# Patient Record
Sex: FEMALE | Race: WHITE
[De-identification: names, ages, dates, MRNs, and addresses within clinical notes are randomized per-mention and may not be internally consistent; named-entity substitution may affect disease eponyms.]

---

## 2018-08-03 ENCOUNTER — HOSPITAL ENCOUNTER (EMERGENCY)
Dept: HOSPITAL 92 - ERS | Age: 18
Discharge: HOME | End: 2018-08-03
Payer: COMMERCIAL

## 2018-08-03 DIAGNOSIS — O21.9: Primary | ICD-10-CM

## 2018-08-03 DIAGNOSIS — Z3A.01: ICD-10-CM

## 2018-08-03 LAB
BASOPHILS # BLD AUTO: 0 THOU/UL (ref 0–0.2)
BASOPHILS NFR BLD AUTO: 0.4 % (ref 0–1)
EOSINOPHIL # BLD AUTO: 0 THOU/UL (ref 0–0.7)
EOSINOPHIL NFR BLD AUTO: 0.3 % (ref 0–10)
HGB BLD-MCNC: 12.5 G/DL (ref 12–16)
LYMPHOCYTES # BLD: 2.3 THOU/UL (ref 1.2–3.4)
LYMPHOCYTES NFR BLD AUTO: 30.1 % (ref 28–48)
MCH RBC QN AUTO: 26.8 PG (ref 25–35)
MCV RBC AUTO: 79.8 FL (ref 78–98)
MONOCYTES # BLD AUTO: 0.6 THOU/UL (ref 0.11–0.59)
MONOCYTES NFR BLD AUTO: 8.5 % (ref 0–4)
NEUTROPHILS # BLD AUTO: 4.6 THOU/UL (ref 1.4–6.5)
NEUTROPHILS NFR BLD AUTO: 60.7 % (ref 31–61)
PLATELET # BLD AUTO: 300 THOU/UL (ref 130–400)
RBC # BLD AUTO: 4.66 MILL/UL (ref 4–5.2)
SP GR UR STRIP: 1.02 (ref 1–1.04)
WBC # BLD AUTO: 7.5 THOU/UL (ref 4.8–10.8)

## 2018-08-03 PROCEDURE — 93976 VASCULAR STUDY: CPT

## 2018-08-03 PROCEDURE — 96360 HYDRATION IV INFUSION INIT: CPT

## 2018-08-03 PROCEDURE — 84702 CHORIONIC GONADOTROPIN TEST: CPT

## 2018-08-03 PROCEDURE — 87491 CHLMYD TRACH DNA AMP PROBE: CPT

## 2018-08-03 PROCEDURE — 85025 COMPLETE CBC W/AUTO DIFF WBC: CPT

## 2018-08-03 PROCEDURE — 86901 BLOOD TYPING SEROLOGIC RH(D): CPT

## 2018-08-03 PROCEDURE — 87591 N.GONORRHOEAE DNA AMP PROB: CPT

## 2018-08-03 PROCEDURE — 87480 CANDIDA DNA DIR PROBE: CPT

## 2018-08-03 PROCEDURE — 76856 US EXAM PELVIC COMPLETE: CPT

## 2018-08-03 PROCEDURE — 96361 HYDRATE IV INFUSION ADD-ON: CPT

## 2018-08-03 PROCEDURE — 87510 GARDNER VAG DNA DIR PROBE: CPT

## 2018-08-03 PROCEDURE — 81003 URINALYSIS AUTO W/O SCOPE: CPT

## 2018-08-03 PROCEDURE — 86900 BLOOD TYPING SEROLOGIC ABO: CPT

## 2018-08-03 PROCEDURE — 87660 TRICHOMONAS VAGIN DIR PROBE: CPT

## 2018-08-03 NOTE — ULT
TRANSABDOMINAL PELVIC ULTRASOUND WITH DOPPLERE:

 

Date:  08/03/18 

 

HISTORY:  

Positive pregnancy test. 

 

FINDINGS:

The uterus measures 9.1 x 4.2 x 5.0 cm. The right ovary measures 3.0 x 1.8 x 2.4 cm and the left ovar
y measures 3.6 x 1.8 x 1.4 cm. Flow is demonstrated to both ovaries. 

 

A single live intrauterine gestation is seen with measurements corresponding to an estimated gestatio
nal age of 6 weeks/6 days and YOVANI at 03/23/19. The crown-rump length measures 0.8 cm, gestational sac
 diameter of 2.01 cm, and yolk sac diameter of 0.17 cm. Fetal heart rate measures 113 beats/minute. N
o subchorionic hemorrhage is seen. No free fluid is seen in the pelvis. 

 

IMPRESSION: 

Single live intrauterine gestation of 6 weeks/6 days estimated gestational age and YOVANI at 03/23/19. 

 

 

POS: [facility]

## 2018-08-10 ENCOUNTER — HOSPITAL ENCOUNTER (OUTPATIENT)
Dept: HOSPITAL 92 - BICULT | Age: 18
Discharge: HOME | End: 2018-08-10
Attending: NURSE PRACTITIONER
Payer: COMMERCIAL

## 2018-08-10 DIAGNOSIS — Z32.00: Primary | ICD-10-CM

## 2018-08-10 PROCEDURE — 76856 US EXAM PELVIC COMPLETE: CPT

## 2020-02-19 ENCOUNTER — HOSPITAL ENCOUNTER (EMERGENCY)
Dept: HOSPITAL 9 - MADERS | Age: 20
Discharge: HOME | End: 2020-02-19
Payer: COMMERCIAL

## 2020-02-19 DIAGNOSIS — E66.9: ICD-10-CM

## 2020-02-19 DIAGNOSIS — K80.50: Primary | ICD-10-CM

## 2020-02-19 LAB
ALBUMIN SERPL BCG-MCNC: 4.3 G/DL (ref 3.5–5)
ALP SERPL-CCNC: 89 U/L (ref 40–110)
ALT SERPL W P-5'-P-CCNC: 49 U/L (ref 8–55)
ANION GAP SERPL CALC-SCNC: 14 MMOL/L (ref 10–20)
AST SERPL-CCNC: 66 U/L (ref 5–34)
BACTERIA UR QL AUTO: (no result) HPF
BASOPHILS # BLD AUTO: 0.1 THOU/UL (ref 0–0.2)
BASOPHILS NFR BLD AUTO: 0.6 % (ref 0–1)
BILIRUB SERPL-MCNC: 0.6 MG/DL (ref 0.2–1.2)
BUN SERPL-MCNC: 10 MG/DL (ref 7–18.7)
CALCIUM SERPL-MCNC: 9.3 MG/DL (ref 7.8–10.44)
CHLORIDE SERPL-SCNC: 105 MMOL/L (ref 98–107)
CO2 SERPL-SCNC: 25 MMOL/L (ref 22–29)
CREAT CL PREDICTED SERPL C-G-VRATE: 0 ML/MIN (ref 70–130)
EOSINOPHIL # BLD AUTO: 0 THOU/UL (ref 0–0.7)
EOSINOPHIL NFR BLD AUTO: 0.1 % (ref 0–10)
GLOBULIN SER CALC-MCNC: 3.3 G/DL (ref 2.4–3.5)
GLUCOSE SERPL-MCNC: 130 MG/DL (ref 70–105)
HGB BLD-MCNC: 13.3 G/DL (ref 12–16)
LIPASE SERPL-CCNC: 43 U/L (ref 8–78)
LYMPHOCYTES # BLD AUTO: 2 THOU/UL (ref 1.2–3.4)
LYMPHOCYTES NFR BLD AUTO: 21 % (ref 21–51)
MCH RBC QN AUTO: 25.8 PG (ref 27–31)
MCV RBC AUTO: 84.4 FL (ref 78–98)
MONOCYTES # BLD AUTO: 0.6 THOU/UL (ref 0.11–0.59)
MONOCYTES NFR BLD AUTO: 5.8 % (ref 0–10)
MUCOUS THREADS UR QL AUTO: (no result) LPF
NEUTROPHILS # BLD AUTO: 6.8 THOU/UL (ref 1.4–6.5)
NEUTROPHILS NFR BLD AUTO: 72.5 % (ref 42–75)
PLATELET # BLD AUTO: 322 THOU/UL (ref 130–400)
POTASSIUM SERPL-SCNC: 4.5 MMOL/L (ref 3.5–5.1)
PREGU CONTROL BACKGROUND?: (no result)
PREGU CONTROL BAR APPEAR?: YES
PROT UR STRIP.AUTO-MCNC: 100 MG/DL
RBC # BLD AUTO: 5.17 MILL/UL (ref 4.2–5.4)
RBC UR QL AUTO: (no result) HPF (ref 0–3)
SODIUM SERPL-SCNC: 139 MMOL/L (ref 136–145)
WBC # BLD AUTO: 9.4 THOU/UL (ref 4.8–10.8)
WBC UR QL AUTO: (no result) HPF (ref 0–3)

## 2020-02-19 PROCEDURE — 93005 ELECTROCARDIOGRAM TRACING: CPT

## 2020-02-19 PROCEDURE — 96374 THER/PROPH/DIAG INJ IV PUSH: CPT

## 2020-02-19 PROCEDURE — 81015 MICROSCOPIC EXAM OF URINE: CPT

## 2020-02-19 PROCEDURE — 81025 URINE PREGNANCY TEST: CPT

## 2020-02-19 PROCEDURE — 81003 URINALYSIS AUTO W/O SCOPE: CPT

## 2020-02-19 PROCEDURE — 83690 ASSAY OF LIPASE: CPT

## 2020-02-19 PROCEDURE — 80053 COMPREHEN METABOLIC PANEL: CPT

## 2020-02-19 PROCEDURE — 85025 COMPLETE CBC W/AUTO DIFF WBC: CPT

## 2020-12-10 ENCOUNTER — HOSPITAL ENCOUNTER (OUTPATIENT)
Dept: HOSPITAL 92 - ERS | Age: 20
Setting detail: OBSERVATION
LOS: 1 days | Discharge: HOME | End: 2020-12-11
Attending: SURGERY | Admitting: SURGERY
Payer: SELF-PAY

## 2020-12-10 VITALS — BODY MASS INDEX: 41.3 KG/M2

## 2020-12-10 DIAGNOSIS — E66.01: ICD-10-CM

## 2020-12-10 DIAGNOSIS — Z20.828: ICD-10-CM

## 2020-12-10 DIAGNOSIS — K80.12: Primary | ICD-10-CM

## 2020-12-10 DIAGNOSIS — Z01.812: ICD-10-CM

## 2020-12-10 LAB
ALBUMIN SERPL BCG-MCNC: 4.8 G/DL (ref 3.5–5)
ALP SERPL-CCNC: 81 U/L (ref 40–110)
ALT SERPL W P-5'-P-CCNC: 71 U/L (ref 8–55)
ANION GAP SERPL CALC-SCNC: 16 MMOL/L (ref 10–20)
AST SERPL-CCNC: 143 U/L (ref 5–34)
BASOPHILS # BLD AUTO: 0 THOU/UL (ref 0–0.2)
BASOPHILS NFR BLD AUTO: 0.2 % (ref 0–1)
BILIRUB SERPL-MCNC: 0.8 MG/DL (ref 0.2–1.2)
BUN SERPL-MCNC: 8 MG/DL (ref 7–18.7)
CALCIUM SERPL-MCNC: 9.5 MG/DL (ref 7.8–10.44)
CHLORIDE SERPL-SCNC: 98 MMOL/L (ref 98–107)
CO2 SERPL-SCNC: 28 MMOL/L (ref 22–29)
CREAT CL PREDICTED SERPL C-G-VRATE: 0 ML/MIN (ref 70–130)
EOSINOPHIL # BLD AUTO: 0.1 THOU/UL (ref 0–0.7)
EOSINOPHIL NFR BLD AUTO: 0.7 % (ref 0–10)
GLOBULIN SER CALC-MCNC: 3.5 G/DL (ref 2.4–3.5)
GLUCOSE SERPL-MCNC: 120 MG/DL (ref 70–105)
HBCM INDEX: 0.16 S/CO (ref 0–0.79)
HBSAG INDEX: 0.22 S/CO (ref 0–0.99)
HEP A IGM S/CO: 0.22 S/CO (ref 0–0.79)
HEP C INDEX: 0.13 S/CO (ref 0–0.79)
HGB BLD-MCNC: 15.3 G/DL (ref 12–16)
LIPASE SERPL-CCNC: 29 U/L (ref 8–78)
LYMPHOCYTES # BLD: 1.8 THOU/UL (ref 1.2–3.4)
LYMPHOCYTES NFR BLD AUTO: 12.1 % (ref 21–51)
MCH RBC QN AUTO: 28.3 PG (ref 27–31)
MCV RBC AUTO: 83.5 FL (ref 78–98)
MONOCYTES # BLD AUTO: 0.8 THOU/UL (ref 0.11–0.59)
MONOCYTES NFR BLD AUTO: 5.4 % (ref 0–10)
NEUTROPHILS # BLD AUTO: 12.4 THOU/UL (ref 1.4–6.5)
NEUTROPHILS NFR BLD AUTO: 81.7 % (ref 42–75)
PLATELET # BLD AUTO: 343 THOU/UL (ref 130–400)
POTASSIUM SERPL-SCNC: 4 MMOL/L (ref 3.5–5.1)
PREGS CONTROL BACKGROUND?: (no result)
PREGS CONTROL BAR APPEAR?: YES
PREGU CONTROL BACKGROUND?: (no result)
PREGU CONTROL BAR APPEAR?: YES
PROT UR STRIP.AUTO-MCNC: 10 MG/DL
RBC # BLD AUTO: 5.43 MILL/UL (ref 4.2–5.4)
SODIUM SERPL-SCNC: 138 MMOL/L (ref 136–145)
SP GR UR STRIP: 1.05 (ref 1–1.04)
WBC # BLD AUTO: 15.1 THOU/UL (ref 4.8–10.8)

## 2020-12-10 PROCEDURE — S0020 INJECTION, BUPIVICAINE HYDRO: HCPCS

## 2020-12-10 PROCEDURE — U0003 INFECTIOUS AGENT DETECTION BY NUCLEIC ACID (DNA OR RNA); SEVERE ACUTE RESPIRATORY SYNDROME CORONAVIRUS 2 (SARS-COV-2) (CORONAVIRUS DISEASE [COVID-19]), AMPLIFIED PROBE TECHNIQUE, MAKING USE OF HIGH THROUGHPUT TECHNOLOGIES AS DESCRIBED BY CMS-2020-01-R: HCPCS

## 2020-12-10 PROCEDURE — 81025 URINE PREGNANCY TEST: CPT

## 2020-12-10 PROCEDURE — 87635 SARS-COV-2 COVID-19 AMP PRB: CPT

## 2020-12-10 PROCEDURE — 83690 ASSAY OF LIPASE: CPT

## 2020-12-10 PROCEDURE — G0378 HOSPITAL OBSERVATION PER HR: HCPCS

## 2020-12-10 PROCEDURE — 87086 URINE CULTURE/COLONY COUNT: CPT

## 2020-12-10 PROCEDURE — 74177 CT ABD & PELVIS W/CONTRAST: CPT

## 2020-12-10 PROCEDURE — 80074 ACUTE HEPATITIS PANEL: CPT

## 2020-12-10 PROCEDURE — 87040 BLOOD CULTURE FOR BACTERIA: CPT

## 2020-12-10 PROCEDURE — 81003 URINALYSIS AUTO W/O SCOPE: CPT

## 2020-12-10 PROCEDURE — 96376 TX/PRO/DX INJ SAME DRUG ADON: CPT

## 2020-12-10 PROCEDURE — G0008 ADMIN INFLUENZA VIRUS VAC: HCPCS

## 2020-12-10 PROCEDURE — 90662 IIV NO PRSV INCREASED AG IM: CPT

## 2020-12-10 PROCEDURE — 83605 ASSAY OF LACTIC ACID: CPT

## 2020-12-10 PROCEDURE — 36415 COLL VENOUS BLD VENIPUNCTURE: CPT

## 2020-12-10 PROCEDURE — 80053 COMPREHEN METABOLIC PANEL: CPT

## 2020-12-10 PROCEDURE — 90471 IMMUNIZATION ADMIN: CPT

## 2020-12-10 PROCEDURE — 76705 ECHO EXAM OF ABDOMEN: CPT

## 2020-12-10 PROCEDURE — 96365 THER/PROPH/DIAG IV INF INIT: CPT

## 2020-12-10 PROCEDURE — 88304 TISSUE EXAM BY PATHOLOGIST: CPT

## 2020-12-10 PROCEDURE — 96367 TX/PROPH/DG ADDL SEQ IV INF: CPT

## 2020-12-10 PROCEDURE — 85025 COMPLETE CBC W/AUTO DIFF WBC: CPT

## 2020-12-10 PROCEDURE — 96372 THER/PROPH/DIAG INJ SC/IM: CPT

## 2020-12-10 PROCEDURE — 84703 CHORIONIC GONADOTROPIN ASSAY: CPT

## 2020-12-10 PROCEDURE — 96375 TX/PRO/DX INJ NEW DRUG ADDON: CPT

## 2020-12-10 NOTE — CT
CT abdomen and pelvis with IV contrast



HISTORY: Right upper quadrant pain.



FINDINGS: Mild atelectasis at the lung bases. Small hyperdense stones within the dependent portion of
 the gallbladder lumen. There is thickening of the gallbladder wall with the appearance of edema.

Subtle stranding in the adjacent fat. Gallbladder appears somewhat distended. Common duct is nondilat
ed.



Solid organs are intact. No free air or free fluid



Urinary bladder is unremarkable.



No evidence of bowel obstruction. Subtle inflamed appearance of the hepatic flexure of the colon favo
red to be secondary and related to the adjacent gallbladder inflammation.

  



IMPRESSION :

Abnormal gallbladder. Appearance of acute cholecystitis (with cholelithiasis).



Reported By: MARYANA Lee 

Electronically Signed:  12/10/2020 1:17 PM

## 2020-12-10 NOTE — HP
HISTORY OF PRESENT ILLNESS:  Alejandra Lau is a 20-year-old female developed

right upper quadrant epigastric pain, back radiation, evaluated in the emergency

room, felt to have exam consistent with cholecystitis.  She underwent a gallbladder

ultrasound that was unremarkable.  She subsequently underwent a CAT scan

demonstrating information about the gallbladder and stones not seen on ultrasound.

The patient had received antibiotics, fluids in the OR was listed early afternoon,

but they could not accommodate cholecystectomy until late evening, thus she is

scheduled for COVID preprocedural test ordered. 



ALLERGIES:  NONE.



TOBACCO:  None.



ALCOHOL:  Socially.



MEDICATIONS:  None routinely.



PAST SURGICAL HISTORY:  .



PAST MEDICAL HISTORY:  Obesity.  Of note, the patient works as a dispatcher.  She is

a single mother.  She reports she had an ultrasound in [place] a year ago that

demonstrated sludge.  She has had intermittent symptoms for the past two years

suggestive of biliary disease. 



REVIEW OF SYSTEMS:  Noncontributory.



FAMILY HISTORY:  Noncontributory.



PHYSICAL EXAMINATION:

GENERAL:  Morbidly obese. 

VITAL SIGNS:  124/78, respiratory rate 16, heart rate 78. 

HEAD, EARS, EYES, NOSE AND THROAT:  Unremarkable.  Sclerae nonicteric. 

SKIN:  Nonjaundiced. 

LUNGS:  Clear to auscultation. 

CARDIAC:  Regular rate and rhythm without murmur or gallop. 

ABDOMEN:  Soft with positive Haro sign.  Tenderness in right upper quadrant. 

EXTREMITIES:  Unremarkable.



LABORATORY DATA:  White count 15, hemoglobin 15.  Basic metabolic profile normal.

Liver function tests normal except for AST,  and 71 respectively.  Serum

pregnancy test negative.  Lipase 29.  Hepatitis survey negative.  Await COVID

preprocedural screening pending. 



ASSESSMENT AND PLAN:  Acute cholecystitis.  We will plan admission to the hospital

with intravenous antibiotics and plan laparoscopic cholecystectomy tomorrow.  Risks

and benefits of operation discussed.  She consents. 







Job ID:  [id number]

## 2020-12-11 VITALS — SYSTOLIC BLOOD PRESSURE: 119 MMHG | TEMPERATURE: 98.9 F | DIASTOLIC BLOOD PRESSURE: 72 MMHG

## 2020-12-11 PROCEDURE — 0FT44ZZ RESECTION OF GALLBLADDER, PERCUTANEOUS ENDOSCOPIC APPROACH: ICD-10-PCS | Performed by: SURGERY

## 2020-12-11 NOTE — OP
DATE OF PROCEDURE:  12/11/2020



PREOPERATIVE DIAGNOSES:  Chronic cholecystitis, acute cholecystitis, cholelithiasis,

morbid obesity. 



POSTOPERATIVE DIAGNOSES:  Chronic cholecystitis, acute cholecystitis,

cholelithiasis, morbid obesity. 



PROCEDURE PERFORMED:  Laparoscopic video cholecystectomy.



ANESTHESIA:  General, local 0.5% Marcaine with epinephrine 30 mL.



DESCRIPTION OF PROCEDURE:  The patient was taken to the operating room, where under

general anesthesia, abdomen was prepared with ChloraPrep and draped in routine

fashion.  Local anesthetic was infiltrated in the skin and subcutaneous tissue about

the operative site.  Supraumbilical incision made, pneumoperitoneum to 15 mmHg

obtained with a Veress needle, replaced with a 5 port, video laparoscope inserted.

Right subxiphoid incision was made and 11 port placed, right subcostal incision made

in midclavicular anterior axillary line and 5 ports placed.  Liver appeared to be

normal.  Gallbladder acutely inflamed.  Fundus grasped and retracted cephalad.  The

infundibulum was grasped and retracted laterally.  Cystic artery and duct carefully

dissected free.  Critical view obtained.  Cystic artery and duct doubly clipped

proximally and divided, gallbladder dissected free from inflammatory attachments to

the liver bed, and gallbladder and contents removed, submitted to Pathology.  Good

hemostasis obtained with cautery.  Irrigant and pneumoperitoneum evacuated.  All

instruments were removed and all skin incisions were approximated with interrupted

subdermal 4-0 Monocryl, and Derma glue applied. 







Job ID:  [id number]

## 2020-12-12 NOTE — DIS
DATE OF ADMISSION:  12/10/2020



DATE OF DISCHARGE:  12/11/2020



DISCHARGE DIAGNOSES:  Morbid obesity, acute on chronic cholecystitis, cholelithiasis.



PROCEDURE PERFORMED:  Laparoscopic video cholecystectomy.



COVID PREPROCEDURAL SCREENING:  Negative.



HISTORY:  A 20-year-old female, previous ultrasound revealed sludge, continued to

have biliary symptoms, presented to the hospital.  On this occasion, had a CAT scan

and ultrasound revealing cholecystitis and cholelithiasis.  COVID preprocedural

screen was negative.  Attempt was made to perform this on the day of surgery, but

there was no OR time available in the morning or afternoon, though she was

hospitalized overnight with antibiotics and taken to the operating room on

12/11/2020 for laparoscopic cholecystectomy with findings of acute cholecystitis,

cholelithiasis.  Postoperatively, the patient convalesced, tolerated diet. 



DISCHARGE MEDICATIONS:  She is sent home with pain control with:

1. Tylenol.

2. Motrin over-the-counter.

3. Ultram if needed.

4. Zofran p.r.n.



DISCHARGE INSTRUCTIONS:  Diet and activity, as tolerated.  No lifting restrictions.

Follow up in my office in 2 to 3 weeks. 







Job ID:  [id number]

## 2020-12-14 ENCOUNTER — HOSPITAL ENCOUNTER (INPATIENT)
Dept: HOSPITAL 92 - ERS | Age: 20
LOS: 2 days | Discharge: HOME | DRG: 395 | End: 2020-12-16
Attending: SURGERY | Admitting: SURGERY
Payer: SELF-PAY

## 2020-12-14 VITALS — BODY MASS INDEX: 38.9 KG/M2

## 2020-12-14 DIAGNOSIS — K91.86: Primary | ICD-10-CM

## 2020-12-14 DIAGNOSIS — E66.01: ICD-10-CM

## 2020-12-14 DIAGNOSIS — Z90.49: ICD-10-CM

## 2020-12-14 DIAGNOSIS — R79.89: ICD-10-CM
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
## 2020-12-14 LAB
ALBUMIN SERPL BCG-MCNC: 4.5 G/DL (ref 3.5–5)
ALP SERPL-CCNC: 121 U/L (ref 40–110)
ALT SERPL W P-5'-P-CCNC: 201 U/L (ref 8–55)
ANION GAP SERPL CALC-SCNC: 18 MMOL/L (ref 10–20)
AST SERPL-CCNC: 147 U/L (ref 5–34)
BASOPHILS # BLD AUTO: 0.1 THOU/UL (ref 0–0.2)
BASOPHILS NFR BLD AUTO: 0.7 % (ref 0–1)
BILIRUB SERPL-MCNC: 4.3 MG/DL (ref 0.2–1.2)
BUN SERPL-MCNC: 6 MG/DL (ref 7–18.7)
CALCIUM SERPL-MCNC: 9.9 MG/DL (ref 7.8–10.44)
CHLORIDE SERPL-SCNC: 97 MMOL/L (ref 98–107)
CO2 SERPL-SCNC: 27 MMOL/L (ref 22–29)
CREAT CL PREDICTED SERPL C-G-VRATE: 0 ML/MIN (ref 70–130)
EOSINOPHIL # BLD AUTO: 0.1 THOU/UL (ref 0–0.7)
EOSINOPHIL NFR BLD AUTO: 0.8 % (ref 0–10)
GLOBULIN SER CALC-MCNC: 4.1 G/DL (ref 2.4–3.5)
GLUCOSE SERPL-MCNC: 105 MG/DL (ref 70–105)
HGB BLD-MCNC: 14.4 G/DL (ref 12–16)
LYMPHOCYTES # BLD: 2.1 THOU/UL (ref 1.2–3.4)
LYMPHOCYTES NFR BLD AUTO: 24.2 % (ref 21–51)
MCH RBC QN AUTO: 27.7 PG (ref 27–31)
MCV RBC AUTO: 83.9 FL (ref 78–98)
MONOCYTES # BLD AUTO: 0.6 THOU/UL (ref 0.11–0.59)
MONOCYTES NFR BLD AUTO: 7.3 % (ref 0–10)
NEUTROPHILS # BLD AUTO: 5.8 THOU/UL (ref 1.4–6.5)
NEUTROPHILS NFR BLD AUTO: 67 % (ref 42–75)
PLATELET # BLD AUTO: 398 THOU/UL (ref 130–400)
POTASSIUM SERPL-SCNC: 3.9 MMOL/L (ref 3.5–5.1)
PROT UR STRIP.AUTO-MCNC: 20 MG/DL
RBC # BLD AUTO: 5.2 MILL/UL (ref 4.2–5.4)
SODIUM SERPL-SCNC: 138 MMOL/L (ref 136–145)
SP GR UR STRIP: (no result) (ref 1–1.04)
WBC # BLD AUTO: 8.6 THOU/UL (ref 4.8–10.8)
WBC UR QL AUTO: (no result) HPF (ref 0–3)

## 2020-12-14 PROCEDURE — 78226 HEPATOBILIARY SYSTEM IMAGING: CPT

## 2020-12-14 PROCEDURE — 80053 COMPREHEN METABOLIC PANEL: CPT

## 2020-12-14 PROCEDURE — 36415 COLL VENOUS BLD VENIPUNCTURE: CPT

## 2020-12-14 PROCEDURE — 74330 X-RAY BILE/PANC ENDOSCOPY: CPT

## 2020-12-14 PROCEDURE — S0028 INJECTION, FAMOTIDINE, 20 MG: HCPCS

## 2020-12-14 PROCEDURE — 80076 HEPATIC FUNCTION PANEL: CPT

## 2020-12-14 PROCEDURE — 74177 CT ABD & PELVIS W/CONTRAST: CPT

## 2020-12-14 PROCEDURE — 83690 ASSAY OF LIPASE: CPT

## 2020-12-14 PROCEDURE — 85025 COMPLETE CBC W/AUTO DIFF WBC: CPT

## 2020-12-14 PROCEDURE — 81003 URINALYSIS AUTO W/O SCOPE: CPT

## 2020-12-14 PROCEDURE — A9537 TC99M MEBROFENIN: HCPCS

## 2020-12-14 PROCEDURE — 81015 MICROSCOPIC EXAM OF URINE: CPT

## 2020-12-14 RX ADMIN — FAMOTIDINE SCH MG: 10 INJECTION, SOLUTION INTRAVENOUS at 22:30

## 2020-12-14 NOTE — HP
HISTORY OF PRESENT ILLNESS:  Alejandra Lau is a 20-year-old female, who three

days ago underwent laparoscopic video cholecystectomy for acute cholecystitis and

cholelithiasis.  Preoperatively, her bile duct was not dilated.  Her liver function

tests were normal.  She was discharged home the same day.  She states she visited my

office today, was complaining of vomiting over the weekend that would not resolve.

She appeared to be dehydrated.  Her vital signs were normal in the office.  She

lives in [place].  She is accompanied by her mother.  The patient was admitted for

IV fluid hydration and evaluation.  Laboratories revealed white count of 8 and

hemoglobin 14.  Bilirubin was 4.3.  Transaminases elevated.  Lipase was normal.

Electrolytes normal.  The patient underwent a CAT scan of the abdomen and pelvis

revealing changes consistent with choledocholithiasis and biliary dilatation.  HIDA

scan had already been ordered and before I can cancel it, it is being performed,

results pending.  Plan is consultation with GI for ERCP.  On CAT scan, there is no

significant fluid subhepatic collection to suggest a bile leak. 



PAST MEDICAL HISTORY:  For past history, see a recent history and physical.



PHYSICAL EXAMINATION:

VITAL SIGNS:  227 pounds, 63 inches, and 40 BMI.  135/102, 86, and 99.3 degrees. 

HEAD, EARS, EYES, NOSE, AND THROAT:  Unremarkable.  Sclerae __________. 

SKIN:  Nonjaundiced. 

LUNGS:  Clear to auscultation. 

CARDIAC:  Regular rate and rhythm without murmur or gallop. 

ABDOMEN:  Soft and nontender.  Surgical wounds look good. 

EXTREMITIES:  Unremarkable.



ASSESSMENT AND PLAN:  

1. Suspect choledocholithiasis postoperatively.  Liver function tests and bile duct

were normal preoperatively.  Bile duct was not dilated preoperatively.  Consult

Gastroenterology for endoscopic retrograde cholangiopancreatography. 

2. Morbid obesity.







Job ID:  [id number]

## 2020-12-14 NOTE — CT
EXAM:  CT ABDOMEN AND PELVIS



HISTORY: Status post cholecystectomy. Nausea and vomiting. Pain.



COMPARISON: 12/10/2020



Procedure: 



Multiple contiguous axial images were obtained and a CT of the abdomen and pelvis with IV contrast. C
oronal reformats were performed.



FINDINGS:



Lower Chest: Minimal atelectasis in the lung bases

Vessels: Normal caliber aorta 

Heart: Normal heart size

Abdomen:

Portal vein:Patent

Gallbladder: Findings compatible with recent cholecystectomy. There is dilatation of the intrahepatic
 and extra hepatic biliary system secondary to a calculus in the distal common bile duct measuring

0.4 cm.

Liver: within normal limits.

Pancreas: within normal limits.

Spleen: within normal limits.

Adrenals: within normal limits.

Kidneys: Symmetric enhancement. No obstructive uropathy.

Peritoneum: No ascites or free air, no fluid collection.

Bowel: Limited evaluation due to the lack of oral contrast administration. No evidence of bowel obstr
uction. Ileocecal junction is unremarkable. Normal caliber appendix. Scattered fecal material in a

nondistended, nondilated colon.

Mesentery and Retroperitoneum: No enlarged mesenteric or retroperitoneal lymph nodes.

Abdominal Wall: Findings compatible with recent endoscopic surgery. Stable dehiscence of the anterior
 abdominal musculature.



Pelvis:

Reproductive Organs: Hypodensity in the left and right adnexa compatible with follicles.

Pelvis: No mass, lymphadenopathy, free air or free fluid. 

Bladder: within normal limits.



Bones: within normal limits.  



IMPRESSION:





1. Findings consistent with recent cholecystectomy

2. Dilatation of the intrahepatic and extra hepatic biliary system secondary to choledocholithiasis.

3. Findings conveyed to Dr. Bill 12/14/2020 at 7:03 PM

Code CR



Reported By: Afshan Wolfe 

Electronically Signed:  12/14/2020 7:05 PM

## 2020-12-14 NOTE — NM
Exam: Nuclear medicine HIDA scan



HISTORY: Recent cholecystectomy. Pain. Evaluate for leak.



COMPARISON: None

Correlation: Abdomen and pelvic CT 12/14/2020



TECHNIQUE: Patient was ministered 5.2 mCi of technetium 99m mebrofenin intravenously



FINDINGS: Appropriate uptake of the radiotracer by the liver. There is no evidence of leak. Note, rad
iotracer does not opacify the intrahepatic or extrahepatic biliary system. Correlation made with

recent CT does demonstrate intra and extra hepatic biliary dilatation secondary to a choledocholithia
sis.



IMPRESSION:

1. No sonographic evidence of a bile leak

2. Lack of opacification of the intrahepatic and extra hepatic biliary system which may be due to obs
truction from a choledocholithiasis. Consider ERCP.



Reported By: Afshan Wolfe 

Electronically Signed:  12/14/2020 9:23 PM

## 2020-12-15 LAB
ALBUMIN SERPL BCG-MCNC: 3.7 G/DL (ref 3.5–5)
ALP SERPL-CCNC: 109 U/L (ref 40–110)
ALT SERPL W P-5'-P-CCNC: 196 U/L (ref 8–55)
ANION GAP SERPL CALC-SCNC: 17 MMOL/L (ref 10–20)
AST SERPL-CCNC: 154 U/L (ref 5–34)
BASOPHILS # BLD AUTO: 0 THOU/UL (ref 0–0.2)
BASOPHILS NFR BLD AUTO: 0.7 % (ref 0–1)
BILIRUB SERPL-MCNC: 4.4 MG/DL (ref 0.2–1.2)
BUN SERPL-MCNC: 6 MG/DL (ref 7–18.7)
CALCIUM SERPL-MCNC: 9.2 MG/DL (ref 7.8–10.44)
CHLORIDE SERPL-SCNC: 98 MMOL/L (ref 98–107)
CO2 SERPL-SCNC: 27 MMOL/L (ref 22–29)
CREAT CL PREDICTED SERPL C-G-VRATE: 0 ML/MIN (ref 70–130)
EOSINOPHIL # BLD AUTO: 0.1 THOU/UL (ref 0–0.7)
EOSINOPHIL NFR BLD AUTO: 1.6 % (ref 0–10)
GLOBULIN SER CALC-MCNC: 3.4 G/DL (ref 2.4–3.5)
GLUCOSE SERPL-MCNC: 99 MG/DL (ref 70–105)
HGB BLD-MCNC: 12.6 G/DL (ref 12–16)
LYMPHOCYTES # BLD: 2.1 THOU/UL (ref 1.2–3.4)
LYMPHOCYTES NFR BLD AUTO: 28.3 % (ref 21–51)
MCH RBC QN AUTO: 28.1 PG (ref 27–31)
MCV RBC AUTO: 85 FL (ref 78–98)
MONOCYTES # BLD AUTO: 0.6 THOU/UL (ref 0.11–0.59)
MONOCYTES NFR BLD AUTO: 8.1 % (ref 0–10)
NEUTROPHILS # BLD AUTO: 4.6 THOU/UL (ref 1.4–6.5)
NEUTROPHILS NFR BLD AUTO: 61.3 % (ref 42–75)
PLATELET # BLD AUTO: 323 THOU/UL (ref 130–400)
POTASSIUM SERPL-SCNC: 3.5 MMOL/L (ref 3.5–5.1)
RBC # BLD AUTO: 4.46 MILL/UL (ref 4.2–5.4)
SODIUM SERPL-SCNC: 138 MMOL/L (ref 136–145)
WBC # BLD AUTO: 7.5 THOU/UL (ref 4.8–10.8)

## 2020-12-15 PROCEDURE — 0F798ZZ DILATION OF COMMON BILE DUCT, VIA NATURAL OR ARTIFICIAL OPENING ENDOSCOPIC: ICD-10-PCS | Performed by: INTERNAL MEDICINE

## 2020-12-15 PROCEDURE — 0F768ZZ DILATION OF LEFT HEPATIC DUCT, VIA NATURAL OR ARTIFICIAL OPENING ENDOSCOPIC: ICD-10-PCS | Performed by: INTERNAL MEDICINE

## 2020-12-15 PROCEDURE — 0F758ZZ DILATION OF RIGHT HEPATIC DUCT, VIA NATURAL OR ARTIFICIAL OPENING ENDOSCOPIC: ICD-10-PCS | Performed by: INTERNAL MEDICINE

## 2020-12-15 RX ADMIN — FAMOTIDINE SCH MG: 10 INJECTION, SOLUTION INTRAVENOUS at 08:00

## 2020-12-15 RX ADMIN — ONDANSETRON PRN MG: 2 INJECTION INTRAMUSCULAR; INTRAVENOUS at 11:46

## 2020-12-15 RX ADMIN — ONDANSETRON PRN MG: 2 INJECTION INTRAMUSCULAR; INTRAVENOUS at 05:55

## 2020-12-15 RX ADMIN — FAMOTIDINE SCH MG: 10 INJECTION, SOLUTION INTRAVENOUS at 20:13

## 2020-12-15 NOTE — CON
DATE OF CONSULTATION:  12/15/2020



REASON FOR CONSULTATION:  Choledocholithiasis.



HISTORY OF PRESENT ILLNESS:  Ms. Lau is a pleasant 20-year-old who had a

laparoscopic cholecystectomy on 2020 for symptomatic cholelithiasis with Dr. Bill.  Preoperative LFTs were abnormal with an AST of 143 and ALT of 71.  Lipase

was normal.  The patient's imaging on 12/10 showed acute cholecystitis.  She

returned with pain yesterday.  Common bile duct was previously measured as 3 mm on

CT.  With her pain yesterday, she had a CAT scan that showed dilated intrahepatic

and extrahepatic ducts with choledocholithiasis.  HIDA scan showed no signs of bile

leak.  Her liver function tests were elevated with bilirubin of 4.3, today it is

4.4; AST and ALT were 147 and 201 with alkaline phosphatase of 129.  Lipase is 27.

Hepatitis A, B, and C serologies were negative back on 12/10.  I have asked to see

her for an ERCP. 



PAST MEDICAL HISTORY:  Notable for being overweight.



PAST SURGICAL HISTORY:   section and recent laparoscopic cholecystectomy.



MEDICATIONS AT HOME:  None.



HABITS:  Alcohol, socially.  Tobacco, none.  Drugs, none.



ALLERGIES:  NONE.



SOCIAL HISTORY:  She is a single mom.  She has a family member here with her.



REVIEW OF SYSTEMS:  Negative for fever, chills, myalgias, arthralgias, or rigors.

Apparently, she did have some fever yesterday she reports. 



FAMILY HISTORY:  Noncontributory.



PRESENT MEDICATIONS:  

1. Lovenox.

2. Pepcid.

3. Apresoline.

4. Toradol.

5. Lactated Ringers 150 an hour.

6. Levofloxacin.

7. P.r.n. morphine.

8. Zofran.

9. Normal saline.



PHYSICAL EXAMINATION:

VITAL SIGNS:  Temperature is 98, afebrile since admission; pulse 86; blood pressure

119/72. 

GENERAL:  The patient is a pleasant young woman who is sitting in bed comfortably.

She is in no distress.  She has had some mild nausea and a little bit of back pain,

but states they are very mild. 

HEENT:  Oropharynx, no lesions. 

NECK:  Supple. 

LUNGS:  Clear. 

HEART:  Regular without clicks or murmurs. 

ABDOMEN:  Soft and nontender.  No rebound or guarding. 

EXTREMITIES:  No clubbing, cyanosis, or edema.



LABORATORY DATA:  White count 7.5, hemoglobin 12, platelet count 323.  Differential

normal.  Liver function tests and lipase as per HPI. 



IMAGING STUDIES:  As per HPI.



ASSESSMENT:  Choledocholithiasis.



PLAN:  ERCP.  Risks, benefits, and possible complications including perforation,

reaction to medication, aspiration, and pancreatitis were discussed with the

patient, and she understands and wished to proceed. 







Job ID:  [id number]

## 2020-12-15 NOTE — RAD
EXAM: ERCP



HISTORY: Status post cholecystectomy with nausea and vomiting and abdominal pain. Choledocholithiasis




COMPARISON: HIDA scan 12/14/2020, CT abdomen/pelvis 12/14/2020



FINDINGS:

Limited intraoperative fluoroscopic views were taken during a an ERCP.

The common bile duct is mildly increased in caliber with a questionable mobile filling defect seen on
 some of the images.

No leakage from the common bile duct.

No abnormality of the intrahepatic bile ducts.

Cholecystectomy clips are seen.



IMPRESSION: Possible filling defect in the common bile duct.



Reported By: Primitivo Kaur 

Electronically Signed:  12/15/2020 3:05 PM

## 2020-12-15 NOTE — PRG
DATE OF SERVICE:  12/15/2020



SUBJECTIVE:  Ms. Lau yesterday was admitted for intractable nausea and vomiting,

past cholecystectomy four days ago.  COVID negative five days ago.  Liver function

tests are elevated, transaminases elevated.  CAT scan of the abdomen and pelvis

reveals absence of extrahepatic fluid collection, and she does have what appears to

be choledocholithiasis and biliary dilatation.  HIDA scan does not visualize the

bile ducts.  The patient otherwise is doing well. 



OBJECTIVE:  LUNGS:  Clear to auscultation. 

CARDIAC:  Regular rate and rhythm without murmur or gallop. 

ABDOMEN:  Soft, postoperative tenderness, laparoscopic wounds well healed.



ASSESSMENT AND PLAN:  Biliary obstruction.  Plan GI consultation with Dr. Gibson,

endoscopic retrograde cholangiopancreatography.  Possible home later today or

tomorrow pending those results. 







Job ID:  [id number]

## 2020-12-16 VITALS — TEMPERATURE: 98.3 F | SYSTOLIC BLOOD PRESSURE: 120 MMHG | DIASTOLIC BLOOD PRESSURE: 71 MMHG

## 2020-12-16 LAB
ALBUMIN SERPL BCG-MCNC: 3.6 G/DL (ref 3.5–5)
ALP SERPL-CCNC: 99 U/L (ref 40–110)
ALT SERPL W P-5'-P-CCNC: 154 U/L (ref 8–55)
AST SERPL-CCNC: 68 U/L (ref 5–34)
BASOPHILS # BLD AUTO: 0.1 THOU/UL (ref 0–0.2)
BASOPHILS NFR BLD AUTO: 0.6 % (ref 0–1)
BILIRUB DIRECT SERPL-MCNC: 0.9 MG/DL (ref 0.1–0.3)
BILIRUB SERPL-MCNC: 1.4 MG/DL (ref 0.2–1.2)
EOSINOPHIL # BLD AUTO: 0.1 THOU/UL (ref 0–0.7)
EOSINOPHIL NFR BLD AUTO: 0.5 % (ref 0–10)
HGB BLD-MCNC: 11.8 G/DL (ref 12–16)
LIPASE SERPL-CCNC: 44 U/L (ref 8–78)
LYMPHOCYTES # BLD: 2.2 THOU/UL (ref 1.2–3.4)
LYMPHOCYTES NFR BLD AUTO: 20.1 % (ref 21–51)
MCH RBC QN AUTO: 27.8 PG (ref 27–31)
MCV RBC AUTO: 85.1 FL (ref 78–98)
MONOCYTES # BLD AUTO: 0.7 THOU/UL (ref 0.11–0.59)
MONOCYTES NFR BLD AUTO: 6.9 % (ref 0–10)
NEUTROPHILS # BLD AUTO: 7.7 THOU/UL (ref 1.4–6.5)
NEUTROPHILS NFR BLD AUTO: 71.9 % (ref 42–75)
PLATELET # BLD AUTO: 315 THOU/UL (ref 130–400)
RBC # BLD AUTO: 4.23 MILL/UL (ref 4.2–5.4)
WBC # BLD AUTO: 10.7 THOU/UL (ref 4.8–10.8)

## 2020-12-16 RX ADMIN — ONDANSETRON PRN MG: 2 INJECTION INTRAMUSCULAR; INTRAVENOUS at 08:20

## 2020-12-16 NOTE — OP
DATE OF PROCEDURE:  12/15/2020



PREPROCEDURE DIAGNOSES:  

1. Elevated LFTs after laparoscopic cholecystectomy.

2. Suspected choledocholithiasis with concern for a distal common bile duct stone,

0.4 mm in size on CT. 



POSTPROCEDURE DIAGNOSES:  12 mm common bile duct.  No evidence of intraluminal

filling defects.  No evidence of bile leak.  Sphincterotomy was performed.  Bile

duct was swept x3 with no filling defects identified.  There was clear bile, no

purulence. 



RECOMMENDATIONS:  Continue antibiotics.  Observe overnight.  If the patient is

dropping LFTs tomorrow, she likely passed stone prior to the ERCP. Also to make sure

she does not have any post ERCP pancreatitis with her fever and endo, would keep her

for 24 hours.  If the patient re-spikes temperatures, we would consider blood

cultures. 



ANESTHESIA:  General endotracheal anesthesia.



DESCRIPTION OF PROCEDURE:  After the patient was informed of the risks, benefits,

and possible complications of endoscopy including perforation, bleeding, reaction to

medication, and aspiration, informed consent was obtained. The patient was brought

to endoscopy suite, where she was sedated in gradual fashion. Once she was

comfortable, the patient was placed in a prone position on the fluoroscopy table,

this is after the airway was secured with intubation.  Bite block placed in the

incisural orifices.  The endoscope was advanced into the esophagus, stomach, and

second and third portions of the duodenum and the ampulla was brought into view.

There was a little bit of debris at the ampulla, possible passing a previous stone.

Free cannulation obtained easily with the guidewire.  Then, cholangiocatheter was

directed into the common bile duct.  The cholangiogram was obtained, showing no

overt filling defects.  There was a concern for possible slight abnormal filling

defect in the left intrahepatic ductal system, but once we moved the patient around,

it seemed that this was probably extra biliary  __________various non-shadowing

spots.  A sphincterotomy was performed with the patient's history and fever.  This

was set at 1 cm size sphincterotomy.  The 15 mm balloon was advanced up into the

proximal common bile duct.  The duct was swept several times.  Right and left distal

common, intrahepatic ducts were swept as well.  There was no evidence of filling

defects, no evidence of purulent bile.  There was no evidence of bile leak.  The

stomach was desufflated.  The biliary tree was noted to drain well.  There was good

hemostasis at the sphincterotomy site.  The scope was removed. The patient tolerated

the procedure well, was brought to recovery room in stable condition. 







Job ID:  [id number]

## 2020-12-16 NOTE — DIS
DATE OF ADMISSION:  12/14/2020



DATE OF DISCHARGE:  12/16/2020



DISCHARGE DIAGNOSIS:  Choledocholithiasis, spontaneously passed.



PROCEDURES:  CAT scan of abdomen and pelvis, HIDA scan, ERCP.



CONSULTATION:  Dr. Gibson. 



Note, discharge hemoglobin 11.8, white count 10.7.  Discharge bilirubin 1.4,

admission bilirubin 4.3.  Transaminases diminished after ERCP. 



HISTORY:  A 20-year-old female, underwent a laparoscopic cholecystectomy on

12/11/2020.  Her liver function tests were normal.  Bile duct was not dilated.

Findings were acute cholecystitis.  Postoperatively, she had intractable nausea,

presented to my office, was admitted from there.  Bilirubin was 4.5.  Transaminase

and alkaline phosphatase elevated.  Lipase normal.  CAT scan of the abdomen and

pelvis did not reveal any fluid collection subhepatic.  HIDA scan did not reveal any

evidence of bile leak.  In fact, there was no opacification of the bile ducts.

There was ductal dilatation new since her cholecystectomy.  She underwent

consultation with Dr. Gibson.  ERCP performed, sphincterotomy, no stone found,

suspecting she had passed.  In the interim, her liver function tests returned to

normal.  Nausea resolved.  She is tolerating her diet.  She has been discharged

home, can return to work tomorrow.  Tylenol and Advil p.r.n. pain (no pain at

discharge). 



DIET AND ACTIVITY:  As tolerated.



FOLLOWUP:  In 2 to 3 weeks.







Job ID:  [id number]